# Patient Record
Sex: MALE | Race: WHITE | NOT HISPANIC OR LATINO | Employment: FULL TIME | URBAN - NONMETROPOLITAN AREA
[De-identification: names, ages, dates, MRNs, and addresses within clinical notes are randomized per-mention and may not be internally consistent; named-entity substitution may affect disease eponyms.]

---

## 2018-08-23 ENCOUNTER — TELEMEDICINE ORIGINATING SITE VISIT (OUTPATIENT)
Dept: MEDICAL GROUP | Facility: CLINIC | Age: 53
End: 2018-08-23

## 2018-08-23 ENCOUNTER — TELEMEDICINE2 (OUTPATIENT)
Dept: URGENT CARE | Facility: CLINIC | Age: 53
End: 2018-08-23

## 2018-08-23 VITALS
BODY MASS INDEX: 19.99 KG/M2 | DIASTOLIC BLOOD PRESSURE: 100 MMHG | OXYGEN SATURATION: 94 % | SYSTOLIC BLOOD PRESSURE: 171 MMHG | TEMPERATURE: 99.4 F | WEIGHT: 135 LBS | HEIGHT: 69 IN | HEART RATE: 99 BPM | RESPIRATION RATE: 16 BRPM

## 2018-08-23 DIAGNOSIS — Z02.89 ENCOUNTER TO OBTAIN EXCUSE FROM WORK: ICD-10-CM

## 2018-08-23 DIAGNOSIS — I10 HYPERTENSION, UNSPECIFIED TYPE: ICD-10-CM

## 2018-08-23 PROCEDURE — 99203 OFFICE O/P NEW LOW 30 MIN: CPT | Performed by: PHYSICIAN ASSISTANT

## 2018-08-23 ASSESSMENT — ENCOUNTER SYMPTOMS
FEVER: 0
NECK PAIN: 0
DIARRHEA: 0
CHILLS: 0
WHEEZING: 0
LOSS OF CONSCIOUSNESS: 0
HEADACHES: 0
TINGLING: 0
VOMITING: 0
SHORTNESS OF BREATH: 0
SEIZURES: 0
PALPITATIONS: 0
BLURRED VISION: 0
DIZZINESS: 0
NAUSEA: 0
COUGH: 0
BACK PAIN: 0
ABDOMINAL PAIN: 0

## 2018-08-23 NOTE — PROGRESS NOTES
Subjective:     Дмитрий Gonzalez is a 53 y.o. male who presents for Letter for School/Work (release to work)       lPt notes would like note releasing w/ permission to return to work - seen via telemed from [facility] [place] to SUNDEEP Galarza - of note pt BP elevated at 171/100, pt denies HA/CP/SOB/dizziness/tinittus/palpitations - notes Has been sick for the last 2-3 days.  He states he had nausea vomiting diarrhea.  He denies fevers chills.  He denies blood per stool in general he feels completely resolved at this time.  Patient seems somewhat incredulous that finding elevated blood pressures and concerned about this.  Upon recheck his blood pressure is still in a similar range.  He states he does drink significant amounts of coffee but denies any other stimulant use.  He denies any concerning symptoms as reviewed above.  He repeatedly denies chest pain palpitations visual changes and tinnitus tingling weakness or even headaches.  He states he has never taken medication for hypertension in the past.      Other   Pertinent negatives include no abdominal pain, chest pain, chills, coughing, fever, headaches, nausea, neck pain, rash or vomiting ( resolved).   History reviewed. No pertinent past medical history.History reviewed. No pertinent surgical history.  Social History     Social History   • Marital status: Single     Spouse name: N/A   • Number of children: N/A   • Years of education: N/A     Occupational History   • Not on file.     Social History Main Topics   • Smoking status: Current Every Day Smoker     Packs/day: 0.50     Years: 15.00     Types: Cigarettes   • Smokeless tobacco: Never Used   • Alcohol use 1.2 oz/week     2 Shots of liquor per week   • Drug use: No   • Sexual activity: No     Other Topics Concern   • Not on file     Social History Narrative   • No narrative on file    History reviewed. No pertinent family history. Review of Systems   Constitutional: Negative for chills and fever.   HENT: Negative  "for tinnitus.    Eyes: Negative for blurred vision.   Respiratory: Negative for cough, shortness of breath and wheezing.    Cardiovascular: Negative for chest pain, palpitations and leg swelling.   Gastrointestinal: Negative for abdominal pain, diarrhea, nausea and vomiting ( resolved).   Musculoskeletal: Negative for back pain and neck pain.   Skin: Negative for rash.   Neurological: Negative for dizziness, tingling, seizures, loss of consciousness and headaches.   No Known Allergies   Objective:   BP (!) 171/100   Pulse 99   Temp 37.4 °C (99.4 °F)   Resp 16   Ht 1.753 m (5' 9\")   Wt 61.2 kg (135 lb)   SpO2 94%   BMI 19.94 kg/m²   Physical Exam   Constitutional: He is oriented to person, place, and time. He appears well-developed and well-nourished. No distress.   HENT:   Head: Normocephalic and atraumatic.   Right Ear: External ear normal.   Left Ear: External ear normal.   Nose: Nose normal.   Eyes: Conjunctivae are normal. Right eye exhibits no discharge. Left eye exhibits no discharge. No scleral icterus.   Pulmonary/Chest: Effort normal. No respiratory distress.   Musculoskeletal: Normal range of motion.   Neurological: He is alert and oriented to person, place, and time. Coordination normal.   Skin: He is not diaphoretic. No pallor.   Psychiatric: He has a normal mood and affect.   Nursing note and vitals reviewed.  Patient was presented for a telehealth consultation via secure and encrypted videoconferencing technology.       Assessment/Plan:   Assessment    1. Encounter to obtain excuse from work    2. Hypertension, unspecified type  - REFERRAL TO FAMILY PRACTICE    Supportive care is reviewed with patient/caregiver - recommend to push PO fluids and electrolytes, follow-up with family medicine, check blood pressure daily or every other day and keep a log, we reviewed red flag symptoms to be seen in the emergency department patient expresses understanding  ER precautions with any worsening symptoms " are reviewed with patient/caregiver and they do express understanding  F/u w/ PCP    Differential diagnosis, natural history, supportive care, and indications for immediate follow-up discussed.

## 2018-08-23 NOTE — LETTER
August 23, 2018       Patient: Дмитрий Gonzalez   YOB: 1965   Date of Visit: 8/23/2018         To Whom It May Concern:    It is my medical opinion that Дмитрий Gonzalez should be permitted to return to work full duty at this time.        If you have any questions or concerns, please don't hesitate to call [phone]          Sincerely,          Morales Toure P.A.-C.  Electronically Signed